# Patient Record
(demographics unavailable — no encounter records)

---

## 2025-01-30 NOTE — PHYSICAL EXAM
[Normal Appearance] : normal appearance [Well Groomed] : well groomed [General Appearance - In No Acute Distress] : no acute distress [Edema] : no peripheral edema [Respiration, Rhythm And Depth] : normal respiratory rhythm and effort [Exaggerated Use Of Accessory Muscles For Inspiration] : no accessory muscle use [Abdomen Soft] : soft [Abdomen Tenderness] : non-tender [Costovertebral Angle Tenderness] : no ~M costovertebral angle tenderness [Urinary Bladder Findings] : the bladder was normal on palpation [Normal Station and Gait] : the gait and station were normal for the patient's age [] : no rash [No Focal Deficits] : no focal deficits [Oriented To Time, Place, And Person] : oriented to person, place, and time [Affect] : the affect was normal [Mood] : the mood was normal [No Palpable Adenopathy] : no palpable adenopathy [Chaperone Present] : A chaperone was present in the examining room during all aspects of the physical examination [FreeTextEntry2] : quin

## 2025-02-03 NOTE — HISTORY OF PRESENT ILLNESS
[FreeTextEntry1] : 60 year old male with ED  Follows with Dr. Gama for BPH  PSA 0.9 1/31/25   ED Duration: years   Erection rigidity w/o meds: 6/10 Rigidity with meds: 7-8/10 Therapies tried: sildenafil   Curvature: mild congenital curve Orgasm/ejaculation: able to Libido: okay   Taking nitrates for chest pain: no A1c: nondiabetic Tobacco/nicotine use: no Marijuana use: no Drug use: no  PMH: none Relevant Surgical hx: umbilical hernia, LIHR Blood thinner use: none

## 2025-02-03 NOTE — ASSESSMENT
[FreeTextEntry1] : 60 year old male with ED  PSA 0.9 1/31/25  - Patient counseled regarding management options which include PDE5i, MARCY, ICI and IPP. Risks and benefits of each discussed.  - Trial 5mg cialis qday + sildenafil 100mg boosters - Can try constriction band as well. Don't use for more than 30min - If unhappy, will try ICI. He will let me know. If we move forward with that, will need teaching visit  Note from Dr. Gama URO from 1/30/25 and 7/23/24 reviewed by me

## 2025-02-12 NOTE — DISCUSSION/SUMMARY
[de-identified] : MRI reviewed and discussed. Patient with little to no discomfort over prepatellar bursa.  Discussed conservative vs surgical options. Patient opted for conservative treatment at this time. prescribed voltaren gel. recommended avoiding kneeling and using compression sleeve, knee pads. Fu as needed.  -------------------------------------------------------------------------   Home Exercise The patient is instructed on a home exercise program.   I, Demar Mckoy, attest that this documentation has been prepared under the direction and in the presence of Provider Conner Eugene MD.   Activity Modification The patient was advised to modify their activities.   Dx / Natural History The patient was advised of the diagnosis. The natural history of the pathology was explained in full to the patient in layman's terms. Several different treatment options were discussed and explained in full to the patient including the risks and benefits of both surgical and non-surgical treatments. All questions and concerns were answered.   Pain Guide Activities The patient was advised to let pain guide the gradual advancement of activities.   RICE I explained to the patient that rest, ice, compression, and elevation would benefit them.  They may return to activity after follow-up or when they no longer have any pain.   The patient's current medication management of their orthopedic diagnosis was reviewed today: (1) We discussed a comprehensive treatment plan that included possible pharmaceutical management involving the use of prescription strength medications including but not limited to options such as oral Naprosyn 500mg BID, once daily Meloxicam 15 mg, or 500-650 mg Tylenol versus over the counter oral medications and topical prescription NSAID Pennsaid vs over the counter Voltaren gel. (2) There is a moderate risk of morbidity with further treatment, especially from use of prescription strength medications and possible side effects of these medications which include upset stomach with oral medications, skin reactions to topical medications and cardiac/renal issues with long term use. (3) I recommended that the patient follow-up with their medical physician to discuss any significant specific potential issues with long term medication use such as interactions with current medications or with exacerbation of underlying medical comorbidities. (4) The benefits and risks associated with use of injectable, oral or topical, prescription and over the counter anti-inflammatory medications were discussed with the patient. The patient voiced understanding of the risks including but not limited to bleeding, stroke, kidney dysfunction, heart disease, and were referred to the black box warning label for further information.

## 2025-02-12 NOTE — DATA REVIEWED
[FreeTextEntry1] : 02/12/2025 [facility] MRI Examination of the RIGHT KNEE obtained on 01/25/2025:  This scan was reviewed and interpreted by Dr. Eugene, and his findings are- IMPRESSION: There is mild prepatellar bursitis which has decreased in size since the prior study but has undergone marked scarring since that time. An acute on chronic injury cannot be excluded. Tear of the medial meniscal body segment, slightly progressed in severity. Mild partial-thickness cartilage loss in the medial compartment, unchanged. Trace joint effusion and tiny Baker's cyst, unchanged.

## 2025-02-12 NOTE — ADDENDUM
[FreeTextEntry1] : Documented by Demar Mckoy acting as a scribe for Dr. Eugene and Glen Cuadra PA-C on 02/12/2025 and was presence for the following sections: Physical Exam; Data Reviewed; Assessment; Discussion/Summary. All medical record entries made by the Scribe were at my, Dr. Eugene, and Glen Cuadra, direction and personally dictated by me on 02/12/2025. I have reviewed the chart and agree that the record accurately reflects my personal performance of the history, physical exam, procedure and imaging.

## 2025-02-12 NOTE — PHYSICAL EXAM
[de-identified] : Neurologic: normal coordination, normal DTR UE/LE , normal sensation, normal mood and affect, orientated and able to communicate.  Constitutional: well developed and well nourished.   Right Knee: no effusion nontender full rom with anterior pain at full flexion stable  nvi palpable bursal tissue over prepatellar bursa

## 2025-02-12 NOTE — HISTORY OF PRESENT ILLNESS
[de-identified] : The patient is a 60 year old [RIGHT] hand dominant male who presents today complaining of RT KNEE pain   Date of Injury/Onset:  DOI 3/25/24 Pain:    At Rest: 0/10  With Activity:  8/10  Mechanism of injury: carrying a heavy bundle of zee shingles. Patient reports his right knee buckled and got pinned This is NOT a Work Related Injury being treated under Worker's Compensation. This is NOT an athletic injury occurring associated with an interscholastic or organized sports team. Quality of symptoms: sharp pain anterior joint line Improves with: rest, activity modification  Worse with: bending down to pick things off ground, stairs, going from sitting to stand Prior treatment: previously seen by Dr Pro- referred for imaging  Prior Imaging: MRI @  1/25/25 Out of work/sport: currently working  School/Sport/Position/Occupation: [employer] Additional information: